# Patient Record
Sex: MALE | Race: WHITE
[De-identification: names, ages, dates, MRNs, and addresses within clinical notes are randomized per-mention and may not be internally consistent; named-entity substitution may affect disease eponyms.]

---

## 2017-02-08 ENCOUNTER — HOSPITAL ENCOUNTER (OUTPATIENT)
Dept: HOSPITAL 33 - ED | Age: 27
Setting detail: OBSERVATION
LOS: 1 days | Discharge: HOME | End: 2017-02-09
Attending: FAMILY MEDICINE | Admitting: FAMILY MEDICINE
Payer: COMMERCIAL

## 2017-02-08 DIAGNOSIS — R55: Primary | ICD-10-CM

## 2017-02-08 DIAGNOSIS — C95.11: ICD-10-CM

## 2017-02-08 DIAGNOSIS — J06.9: ICD-10-CM

## 2017-02-08 DIAGNOSIS — B97.89: ICD-10-CM

## 2017-02-08 DIAGNOSIS — E66.01: ICD-10-CM

## 2017-02-08 DIAGNOSIS — C95.10: ICD-10-CM

## 2017-02-08 LAB
ALBUMIN SERPL-MCNC: 4 G/DL (ref 3.4–5)
ALP SERPL-CCNC: 79 U/L (ref 46–116)
ALT SERPL-CCNC: 23 U/L (ref 12–78)
ANION GAP SERPL CALC-SCNC: 16.5 MEQ/L (ref 5–15)
AST SERPL QL: 20 U/L (ref 15–37)
BASOPHILS NFR BLD AUTO: 0.3 % (ref 0–0.4)
BILIRUB BLD-MCNC: 0.9 MG/DL (ref 0.2–1)
BUN SERPL-MCNC: 10 MG/DL (ref 9–20)
CHLORIDE SERPL-SCNC: 103 MEQ/L (ref 98–107)
CO2 SERPL-SCNC: 24.7 MEQ/L (ref 21–32)
COLLECTION TYPE: (no result)
COMPLETE URINE MICROSCOPIC?: YES
GLUCOSE SERPL-MCNC: 99 MG/DL (ref 70–110)
MCH RBC QN AUTO: 27.5 PG (ref 26–32)
NEUTROPHILS NFR BLD AUTO: 77.4 % (ref 36–66)
PLATELET # BLD AUTO: 195 K/MM3 (ref 150–450)
POTASSIUM SERPLBLD-SCNC: 4.2 MEQ/L (ref 3.5–5.1)
PROT SERPL-MCNC: 7.7 GM/DL (ref 6.4–8.2)
RBC # BLD AUTO: 5.34 M/MM3 (ref 4.1–5.6)
SODIUM SERPL-SCNC: 140 MEQ/L (ref 136–145)
WBC # BLD AUTO: 10.5 K/MM3 (ref 4–10.5)
WBC URNS QL MICRO: (no result) /HPF (ref 0–5)

## 2017-02-08 PROCEDURE — 93041 RHYTHM ECG TRACING: CPT

## 2017-02-08 PROCEDURE — 87430 STREP A AG IA: CPT

## 2017-02-08 PROCEDURE — 83605 ASSAY OF LACTIC ACID: CPT

## 2017-02-08 PROCEDURE — 96361 HYDRATE IV INFUSION ADD-ON: CPT

## 2017-02-08 PROCEDURE — 87040 BLOOD CULTURE FOR BACTERIA: CPT

## 2017-02-08 PROCEDURE — 36415 COLL VENOUS BLD VENIPUNCTURE: CPT

## 2017-02-08 PROCEDURE — 87070 CULTURE OTHR SPECIMN AEROBIC: CPT

## 2017-02-08 PROCEDURE — 81000 URINALYSIS NONAUTO W/SCOPE: CPT

## 2017-02-08 PROCEDURE — 80053 COMPREHEN METABOLIC PANEL: CPT

## 2017-02-08 PROCEDURE — 36000 PLACE NEEDLE IN VEIN: CPT

## 2017-02-08 PROCEDURE — 93005 ELECTROCARDIOGRAM TRACING: CPT

## 2017-02-08 PROCEDURE — 71010: CPT

## 2017-02-08 PROCEDURE — 99284 EMERGENCY DEPT VISIT MOD MDM: CPT

## 2017-02-08 PROCEDURE — 82962 GLUCOSE BLOOD TEST: CPT

## 2017-02-08 PROCEDURE — 85025 COMPLETE CBC W/AUTO DIFF WBC: CPT

## 2017-02-08 PROCEDURE — 93268 ECG RECORD/REVIEW: CPT

## 2017-02-08 PROCEDURE — 80048 BASIC METABOLIC PNL TOTAL CA: CPT

## 2017-02-08 PROCEDURE — 96360 HYDRATION IV INFUSION INIT: CPT

## 2017-02-08 PROCEDURE — 99285 EMERGENCY DEPT VISIT HI MDM: CPT

## 2017-02-08 PROCEDURE — 87631 RESP VIRUS 3-5 TARGETS: CPT

## 2017-02-08 NOTE — ERPHSYRPT
- History of Present Illness


Source: patient


Exam Limitations: clinical condition


Patient Subjective Stated Complaint: "i statrted feeling bad earlier today. i 

went to try to take a shower and became light headed, i vission got funny and i 

almost passed out. i am taking a chemo drug still for my chronic lukemia"


Triage Nursing Assessment: aox3, breating unlabored,s kin flushed, diaphoretic, 

warm, steady gait, speaking in complete setences


Physician History: 


Patient with nasal congestion sinus pressure and cough sore throat over past 

couple of days and today when he walked into the shower he felt like he would 

pass out and did not actual fall or lose consciousness.  Came here for further 

evaluation treatment.  Patient has history of chronic leukemia on ongoing 

medication is on disability for such.  He thinks he's had a low-grade fever and 

occasional chills.  No wheezing shortness of breath   Or chest pain.





Prior Episodes: single episode today


Timing/Duration: today


Precipitating Factors: lightheadedness, other (as above)


Context: standing


Loss of Consciousness: no loss of consciousness, other (as noted)


Charcter of event(s): almost passed out


Allergies/Adverse Reactions: 








No Known Drug Allergies Allergy (Verified 02/08/17 17:24)


 





Home Medications: 








Dasatinib [Sprycel] 100 mg PO DAILY 02/08/17 [History]





Hx Tetanus, Diphtheria Vaccination/Date Given: Yes


Hx Influenza Vaccination/Date Given: No


Hx Pneumococcal Vaccination/Date Given: No





- Past Medical History


Pertinent Past Medical History: No


Neurological History: No Pertinent History


Cardiac History: No Pertinent History


Respiratory History: Asthma


Endocrine Medical History: No Pertinent History


Musculoskeletal History: Arthritis


Other Medical History: Chronic Myeloid Leukemia





- Past Surgical History


Past Surgical History: Yes


Other Surgical History: CYST REMOVAL





- Social History


Smoking Status: Never smoker


Exposure to second hand smoke: No


Drug Use: marijuana


Patient Lives Alone: No





- Review of Systems


Constitutional: Fever, Chills, Fatigue, Lethargy, Malaise, Weakness


Eyes: No Symptoms


Ears, Nose, & Throat: No Symptoms


Respiratory: Cough, No Dyspnea, No Dyspnea on Exertion (FIELDS), No Wheezing


Cardiac: Other, No Chest Pain, No Edema, No Palpitations, No Syncope (

presyncope as noted)


Abdominal/Gastrointestinal: No Symptoms, Abdominal Pain, Nausea


Genitourinary Symptoms: No Symptoms, Dysuria


Musculoskeletal: No Symptoms, No Arthralgias, No Back Pain, No Neck Pain, No 

Deformity, No Fall


Skin: No Symptoms


Neurological: No Symptoms


Psychological: No Symptoms


Endocrine: No Symptoms


Hematologic/Lymphatic: No Symptoms


Immunological/Allergic: No Symptoms


All Other Systems: Reviewed and Negative





Physical Exam





- Nursing Vital Signs


Nursing Vital Signs: 


 Initial Vital Signs











Temperature                    99.7 F


 


Temperature Source             Oral


 


Pulse Rate                     92


 


Respiratory Rate               16


 


Blood Pressure [Right Arm]     126/60


 


Pain Intensity                 3

















- Rafael Coma Scale


Best Eye Response ([place]): (2) open to pain


Best Verbal Response ([place]): (5) oriented


Best Motor Response ([place]): (6) obeys commands


Rafael Total: 13





- Physical Exam


General Appearance: mild distress, obese (super morbid)


Eye Exam: bilateral eye: normal inspection, PERRL, EOMI


Ears, Nose, Throat Exam: pharyngeal erythema, other (Nasal mucosal edema with 

palpable  Tenderness)


Neck Exam: normal inspection, non-tender, supple, full range of motion, No 

meningismus, No Brudzinski


Respiratory: normal breath sounds, lungs clear, airway intact, No chest 

tenderness


Cardiovascular: regular rate/rhythm, normal heart sounds, normal peripheral 

pulses, capillary refill <2 sec


Gastrointestinal: soft, normal bowel sounds, other (Massively obese difficult 

exam), No tenderness, No distention, No mass, No guarding, No rebound, No hernia


Rectal Exam: deferred


Back Exam: normal inspection, normal range of motion, No CVA tenderness


Extremity Exam: normal inspection, normal range of motion


Peripheral Pulses: carotid (R): 2+, carotid (L): 2+


Mental Status: alert, oriented x 3, cooperative


CNs Exam: normal hearing, normal speech, PERRL


Coordination/Gait: normal finger to nose


Motor/Sensory: no motor deficit, no sensory deficit, no pronator drift, 

negative Babinski's sign, No weak motor strength RUE, No weak motor strength LUE

, No weak motor strength RLE, No weak motor strength LLE


Skin Exam: normal color, warm, dry


**SpO2 Interpretation**: normal


SpO2: 97


Oxygen Delivery: Room Air





- Course


Nursing assessment & vital signs reviewed: Yes


EKG Interpreted by Me: RATE ([id number]), Sinus Tach, Left Axis Deviation, NORMAL 

INTERVALS, NORMAL QRS, NORMAL ST-T, Other (no acute changes)





- Radiology Exams


  ** Chest


X-ray Interpretation: Interpreted by me, No Infiltrates, Other (borderline 

cardiomegaly)


Ordered Tests: 


 Active Orders 24 hr











 Category Date Time Status


 


 Up With Assistance ROUTINE Activity  02/08/17 19:50 Active


 


 Accucheck STAT Care  02/08/17 17:26 Active


 


 Admission/Status Order ROUTINE Care  02/08/17 19:50 Active


 


 Cardiac Monitor STAT Care  02/08/17 17:26 Active


 


 Code Status Order ROUTINE Care  02/08/17 19:50 Active


 


 EKG-ER Only STAT Care  02/08/17 17:26 Active


 


 Fall Protocol ROUTINE Care  02/08/17 19:52 Active


 


 IV Care Q6H Care  02/08/17 19:50 Active


 


 IV Insertion STAT Care  02/08/17 17:26 Active


 


 Miscellaneous Nursing Order ROUTINE Care  02/08/17 19:50 Active


 


 Neuro Checks Q4H Care  02/08/17 19:50 Active


 


 Pulse Oximetry (ED) STAT Care  02/08/17 17:26 Active


 


 Rectal Temperature STAT Care  02/08/17 17:26 Active


 


 Telemetry ROUTINE Care  02/08/17 19:50 Active


 


 Vital Signs Q4H Care  02/08/17 19:50 Active


 


 Regular Diet Diet  02/08/17 Breakfast Active


 


 CHEST 1 VIEW (PORTABLE) Stat Exams  02/08/17 17:27 Taken


 


 BLOOD CULTURE Stat Lab  02/08/17 17:57 Received


 


 CBC W DIFF AM.LAB Lab  02/09/17 04:00 Ordered


 


 CBC W DIFF Stat Lab  02/08/17 17:40 Completed


 


 CMP AM.LAB Lab  02/09/17 04:00 Ordered


 


 CMP Stat Lab  02/08/17 17:40 Completed


 


 CULTURE, THROAT Stat Lab  02/08/17 17:55 Received


 


 Lactic Acid Urgent Lab  02/08/17 17:50 Completed


 


 STREP SCREEN-BETA A Stat Lab  02/08/17 17:55 Completed


 


 UA Stat Lab  02/08/17 18:15 Received


 


 Pulse Oximetry CONTINUOUS RT  02/08/17 19:52 Active








Medication Summary











Generic Name Dose Route Start Last Admin





  Trade Name Freq  PRN Reason Stop Dose Admin


 


Acetaminophen  650 mg  02/08/17 19:50  





  Tylenol 325 Mg***  PO  03/10/17 19:49  





  Q4H PRN PRN   





  PAIN AND/OR FEVER   


 


Sodium Chloride  1,000 mls @ 150 mls/hr  02/08/17 20:00  





  Sodium Chloride 0.9% 1000 Ml  IV  03/10/17 19:59  





  .Q6H40M [facility]   


 


Ondansetron HCl  4 mg  02/08/17 19:50  





  Zofran 4 Mg/2 Ml Vial**  IV  03/10/17 19:49  





  Q6H PRN PRN   





  NAUSEA/VOMITING   














Discontinued Medications














Generic Name Dose Route Start Last Admin





  Trade Name Freq  PRN Reason Stop Dose Admin


 


Acetaminophen  1,000 mg  02/08/17 18:06  02/08/17 18:12





  Tylenol Extra Strength 500 Mg***  PO  02/08/17 18:07  1,000 mg





  STAT STA   Administration


 


Acetaminophen  Confirm  02/08/17 18:11  





  Tylenol Extra Strength 500 Mg***  Administered  02/08/17 18:12  





  Dose   





  1,000 mg   





  .ROUTE   





  .STK-MED ONE   


 


Sodium Chloride  1,000 mls @ 999 mls/hr  02/08/17 17:26  02/08/17 17:56





  Sodium Chloride 0.9% 1000 Ml  IV  02/08/17 18:26  999 mls/hr





  .Q1H1M STA   Administration


 


Sodium Chloride  Confirm  02/08/17 17:43  





  Sodium Chloride 0.9% 1000 Ml  Administered  02/08/17 17:44  





  Dose   





  1,000 mls @ ud   





  .ROUTE   





  .STK-MED ONE   


 


Sodium Chloride  1,000 mls @ 999 mls/hr  02/08/17 19:14  02/08/17 19:37





  Sodium Chloride 0.9% 1000 Ml  IV  02/08/17 20:14  999 mls/hr





  .Q1H1M STA   Administration


 


Sodium Chloride  Confirm  02/08/17 19:17  





  Sodium Chloride 0.9% 1000 Ml  Administered  02/08/17 19:18  





  Dose   





  1,000 mls @ ud   





  .ROUTE   





  .STK-MED ONE   


 


Sodium Chloride  Confirm  02/08/17 19:33  





  Sodium Chloride 0.9% 1000 Ml  Administered  02/08/17 19:34  





  Dose   





  1,000 mls @ ud   





  .ROUTE   





  .STK-MED ONE   











Lab/Rad Data: 


 Laboratory Result Diagrams





 02/08/17 17:40 





 02/08/17 17:40 





 Laboratory Results











  02/08/17 02/08/17 02/08/17 Range/Units





  17:55 17:55 17:50 


 


WBC     (4.0-10.5)  K/mm3


 


RBC     (4.1-5.6)  M/mm3


 


Hgb     (12.5-18.0)  gm/dl


 


Hct     (42-50)  %


 


MCV     ()  fl


 


MCH     (26-32)  pg


 


MCHC     (32-36)  g/dl


 


RDW     (11.5-14.0)  %


 


Plt Count     (150-450)  K/mm3


 


MPV     (6-9.5)  fl


 


Gran %     (36.0-66.0)  %


 


Lymphocytes %     (24.0-44.0)  %


 


Monocytes %     (0.0-12.0)  %


 


Eosinophils %     (0.00-5.0)  %


 


Basophils %     (0.0-0.4)  %


 


Basophils #     (0-0.4)  


 


Sodium     (136-145)  mEq/L


 


Potassium     (3.5-5.1)  mEq/L


 


Chloride     ()  mEq/L


 


Carbon Dioxide     (21-32)  mEq/L


 


Anion Gap     (5-15)  MEQ/L


 


BUN     (9-20)  mg/dL


 


Creatinine     (0.55-1.30)  mg/dl


 


Estimated GFR     ML/MIN


 


Glucose     ()  MG/DL


 


Lactic Acid    1.3  (0.4-2.0)  


 


Calcium     (8.5-10.1)  mg/dL


 


Total Bilirubin     (0.2-1.0)  mg/dL


 


AST     (15-37)  U/L


 


ALT     (12-78)  U/L


 


Alkaline Phosphatase     ()  U/L


 


Serum Total Protein     (6.4-8.2)  gm/dL


 


Albumin     (3.4-5.0)  g/dL


 


Streptococcus Screen   NEGATIVE   (Negative)  


 


Resp Infection Panel  NEGATIVE    (Negative)  














  02/08/17 02/08/17 Range/Units





  17:40 17:40 


 


WBC   10.5  (4.0-10.5)  K/mm3


 


RBC   5.34  (4.1-5.6)  M/mm3


 


Hgb   14.7  (12.5-18.0)  gm/dl


 


Hct   42.6  (42-50)  %


 


MCV   79.8  ()  fl


 


MCH   27.5  (26-32)  pg


 


MCHC   34.5  (32-36)  g/dl


 


RDW   14.7 H  (11.5-14.0)  %


 


Plt Count   195  (150-450)  K/mm3


 


MPV   9.7 H  (6-9.5)  fl


 


Gran %   77.4 H  (36.0-66.0)  %


 


Lymphocytes %   10.6 L  (24.0-44.0)  %


 


Monocytes %   10.9  (0.0-12.0)  %


 


Eosinophils %   0.8  (0.00-5.0)  %


 


Basophils %   0.3  (0.0-0.4)  %


 


Basophils #   0.03  (0-0.4)  


 


Sodium  140   (136-145)  mEq/L


 


Potassium  4.2   (3.5-5.1)  mEq/L


 


Chloride  103   ()  mEq/L


 


Carbon Dioxide  24.7   (21-32)  mEq/L


 


Anion Gap  16.5 H   (5-15)  MEQ/L


 


BUN  10   (9-20)  mg/dL


 


Creatinine  0.87   (0.55-1.30)  mg/dl


 


Estimated GFR  > 60   ML/MIN


 


Glucose  99   ()  MG/DL


 


Lactic Acid    (0.4-2.0)  


 


Calcium  9.0   (8.5-10.1)  mg/dL


 


Total Bilirubin  0.9   (0.2-1.0)  mg/dL


 


AST  20   (15-37)  U/L


 


ALT  23   (12-78)  U/L


 


Alkaline Phosphatase  79   ()  U/L


 


Serum Total Protein  7.7   (6.4-8.2)  gm/dL


 


Albumin  4.0   (3.4-5.0)  g/dL


 


Streptococcus Screen    (Negative)  


 


Resp Infection Panel    (Negative)  














- Progress


Progress: unchanged


Progress Note: 





02/08/17 19:46Noted rectal temp 101.5.  Treated with Tylenol with good results.

  Testing for strep and respiratory diseases are negative.  Chest x-ray was 

negative.  CBC did show a left shift but no other significant changes.  Patient 

still felt generalized malaise and mild headache and weak in general so case 

was discussed with Dr. Bojorquez and patient will be admitted for observation given 

the fact he has chronic leukemia.  This is in agreement with the patient and 

mom.








Discussed with : [place]


Will see patient in: hospital (observation)


Counseled pt/family regarding: lab results, diagnosis, need for follow-up, rad 

results





- Departure


Time of Disposition: 19:47


Departure Disposition: Observation


Clinical Impression: 


 Pre-syncope, Acute viral syndrome, Morbid obesity with BMI of 50.0-59.9, adult





URI (upper respiratory infection)


Qualifiers:


 URI type: unspecified viral URI Qualified Code(s): J06.9 - Acute upper 

respiratory infection, unspecified





Chronic leukemia


Qualifiers:


 Leukemia Active/Remission status: in remission Qualified Code(s): C95.11 - 

Chronic leukemia of unspecified cell type, in remission





Fever


Qualifiers:


 Fever type: unspecified Qualified Code(s): R50.9 - Fever, unspecified





Condition: Stable


Critical Care Time: No

## 2017-02-09 VITALS — SYSTOLIC BLOOD PRESSURE: 123 MMHG | HEART RATE: 83 BPM | DIASTOLIC BLOOD PRESSURE: 58 MMHG

## 2017-02-09 VITALS — OXYGEN SATURATION: 97 %

## 2017-02-09 LAB
BASOPHILS NFR BLD AUTO: 0.5 % (ref 0–0.4)
MCH RBC QN AUTO: 26.8 PG (ref 26–32)
NEUTROPHILS NFR BLD AUTO: 53.1 % (ref 36–66)
PLATELET # BLD AUTO: 223 K/MM3 (ref 150–450)
RBC # BLD AUTO: 4.66 M/MM3 (ref 4.1–5.6)
WBC # BLD AUTO: 5.8 K/MM3 (ref 4–10.5)

## 2017-02-09 NOTE — PCM.SSS
History of Present Illness





- Chief Complaint


Chief Complaint: Presyncope


History of Present Illness: 


 is a 26 year old male who presented to the ER complaining of cough, 

congestion, fever and lightheadedness. He felt weak at home and thought he 

might pass out. In the ER his workup was essentially negative, he was hydrated 

overnight and observed for presyncope. He has been out of bed to the restroom 

in his room this morning with no dizziness or lightheadedness, he has not had 

fever overnight.








- Review of Systems


Constitutional: Fever, Chills, Weakness


Respiratory: Cough


Cardiac: No Chest Pain, No Edema, No Syncope


Abdominal/Gastrointestinal: No Abdominal Pain, No Nausea, No Vomiting, No 

Diarrhea


Genitourinary Symptoms: No Dysuria


Skin: No Rash


All Other Systems: Reviewed and Negative





Medications & Allergies


Home Medications: 


 Home Medication List





Dasatinib [Sprycel] 100 mg PO DAILY 02/08/17 [History Confirmed 02/08/17]








Allergies/Adverse Reactions: 


 Allergies











Allergy/AdvReac Type Severity Reaction Status Date / Time


 


No Known Drug Allergies Allergy   Verified 02/08/17 17:24














- Past Medical History


Past Medical History: No


Neurological History: No Pertinent History


ENT History: No Pertinent History


Cardiac History: No Pertinent History


Respiratory History: Asthma


Endocrine Medical History: No Pertinent History


Musculoskelatal History: Arthritis


GI Medical History: No Pertinent History


 History: No Pertinent History


Pyscho-Social History: No Pertinent History


Male Reproductive Disorders: No Pertinent History


Comment: Chronic Myeloid Leukemia





- Past Surgical History


Past Surgical History: Yes


Neuro Surgical History: No Pertinent History


Cardiac History: No Pertinent History


Respiratory Surgery: No Pertinent History


GI Surgical History: No Pertinent History


Genitourinary Surgical Hx: No Pertinent History


Musculskeletal Surgical Hx: No Pertinent History


Male Surgical History: No Pertinent History


Other Surgical History: CYST REMOVAL.  Penial shunt





- Social History


Smoking Status: Never smoker


Exposure to second hand smoke: No


Alcohol: Weekly


Drug Use: none





- Physical Exam


Vital Signs: 


 Vital Signs - 24 hr











  Temp Pulse Resp BP BP Pulse Ox


 


 02/09/17 08:00  97.7 F  83  20  123/58   97


 


 02/09/17 03:46    20   


 


 02/09/17 03:39  97.7 F  79  20  123/57   97


 


 02/09/17 00:00    20   


 


 02/08/17 23:31  97.8 F  87  20  125/57   96


 


 02/08/17 23:14  98.1 F  103 H  21  122/55   98


 


 02/08/17 20:47       97


 


 02/08/17 19:14  99.7 F  92 H  16   126/60  98


 


 02/08/17 18:13   101 H  18   128/60  97


 


 02/08/17 18:12  101.5 F     


 


 02/08/17 18:04  101.5 F     


 


 02/08/17 17:31       95


 


 02/08/17 17:18  100.1 F  113 H  20   151/64  97











General Appearance: no apparent distress, alert, obese (morbid)


Respiratory Exam: normal breath sounds, lungs clear, No respiratory distress


Cardiovascular Exam: regular rate/rhythm, normal heart sounds, normal 

peripheral pulses


Gastrointestinal/Abdomen Exam: soft, normal bowel sounds, No tenderness, No mass


Extremity Exam: normal inspection, normal range of motion, pelvis stable


Skin Exam: normal color, warm, dry, No rash





Results





- Labs


Lab/Micro Results: 


 Lab Results-Last 24 Hours











  02/09/17 02/09/17 Range/Units





  04:48 04:48 


 


WBC  5.8   (4.0-10.5)  K/mm3


 


RBC  4.66   (4.1-5.6)  M/mm3


 


Hgb  12.5   (12.5-18.0)  gm/dl


 


Hct  37.9 L   (42-50)  %


 


MCV  81.3   ()  fl


 


MCH  26.8   (26-32)  pg


 


MCHC  33.0   (32-36)  g/dl


 


RDW  14.8 H   (11.5-14.0)  %


 


Plt Count  223   (150-450)  K/mm3


 


MPV  9.7 H   (6-9.5)  fl


 


Gran %  53.1   (36.0-66.0)  %


 


Lymphocytes %  30.6   (24.0-44.0)  %


 


Monocytes %  13.0 H   (0.0-12.0)  %


 


Eosinophils %  2.8   (0.00-5.0)  %


 


Basophils %  0.5   (0.0-0.4)  %


 


Basophils #  0.03   (0-0.4)  


 


Glucose     ()  MG/DL














Assessment/Plan


(1) Pre-syncope


Current Visit: Yes   Status: Acute   


Assessment & Plan: 


symptoms resolved, likely related to fever and viral illness. nothing worrisome 

on monitor overnight








(2) URI (upper respiratory infection)


Current Visit: Yes   Status: Acute   


Qualifiers: 


   URI type: unspecified viral URI   Qualified Code(s): J06.9 - Acute upper 

respiratory infection, unspecified; B97.89 - Other viral agents as the cause of 

diseases classified elsewhere   


Code(s): J06.9 - ACUTE UPPER RESPIRATORY INFECTION, UNSPECIFIED   





(3) Chronic leukemia


Current Visit: Yes   Status: Acute   


Qualifiers: 


   Leukemia Active/Remission status: in remission   Qualified Code(s): C95.11 - 

Chronic leukemia of unspecified cell type, in remission   


Code(s): C95.10 - CHRONIC LEUKEMIA OF UNSP CELL TYPE NOT ACHIEVE REMISSION   





(4) Morbid obesity with BMI of 50.0-59.9, adult


Current Visit: Yes   Status: Acute   Code(s): E66.01 - MORBID (SEVERE) OBESITY 

DUE TO EXCESS CALORIES; Z68.43 - BODY MASS INDEX (BMI) 50-59.9 , ADULT   





Hospital Summary





- Vitals & Intake/Output


Vital Signs: 


 Vital Signs











Temperature  97.7 F   02/09/17 08:00


 


Pulse Rate  83   02/09/17 08:00


 


Respiratory Rate  20   02/09/17 08:00


 


Blood Pressure  123/58   02/09/17 08:00


 


O2 Sat by Pulse Oximetry  97   02/09/17 08:00











Intake & Output: 


 Intake & Output











 02/06/17 02/07/17 02/08/17 02/09/17





 11:59 11:59 11:59 11:59


 


Intake Total    1852


 


Balance    1852


 


Weight    175.71 kg














- Lab


Result Diagrams: 


 02/09/17 04:48





 02/08/17 17:40


Lab Results-Last 24 Hrs: 


 Lab Results-Last 24 Hours











  02/09/17 02/09/17 Range/Units





  04:48 04:48 


 


WBC  5.8   (4.0-10.5)  K/mm3


 


RBC  4.66   (4.1-5.6)  M/mm3


 


Hgb  12.5   (12.5-18.0)  gm/dl


 


Hct  37.9 L   (42-50)  %


 


MCV  81.3   ()  fl


 


MCH  26.8   (26-32)  pg


 


MCHC  33.0   (32-36)  g/dl


 


RDW  14.8 H   (11.5-14.0)  %


 


Plt Count  223   (150-450)  K/mm3


 


MPV  9.7 H   (6-9.5)  fl


 


Gran %  53.1   (36.0-66.0)  %


 


Lymphocytes %  30.6   (24.0-44.0)  %


 


Monocytes %  13.0 H   (0.0-12.0)  %


 


Eosinophils %  2.8   (0.00-5.0)  %


 


Basophils %  0.5   (0.0-0.4)  %


 


Basophils #  0.03   (0-0.4)  


 


Glucose     ()  MG/DL














- Discharge


Disposition: Home, Self-Care


Condition: Stable


Prescriptions: 


Continue


   Dasatinib [Sprycel] 100 mg PO DAILY


Additional Instructions: 


push clear liquids, rest and take tylenol for fever. followup in the office in 

1 week for recheck. return for new symmptoms or change of condition.


Follow up with: 


LORI CERON MD [Primary Care Provider] -

## 2017-02-09 NOTE — XRAY
Indication: Fever.



Comparison: None



Portable apical lordotic chest clear.  Heart and mediastinal structures within

normal limits for AP portable projection.  Bony thorax intact.



Impression: Nonacute chest.

## 2022-07-06 ENCOUNTER — HOSPITAL ENCOUNTER (EMERGENCY)
Dept: HOSPITAL 33 - ED | Age: 32
Discharge: HOME | End: 2022-07-06
Payer: COMMERCIAL

## 2022-07-06 VITALS — HEART RATE: 78 BPM | DIASTOLIC BLOOD PRESSURE: 82 MMHG | SYSTOLIC BLOOD PRESSURE: 141 MMHG

## 2022-07-06 VITALS — OXYGEN SATURATION: 98 %

## 2022-07-06 DIAGNOSIS — R61: ICD-10-CM

## 2022-07-06 DIAGNOSIS — Z79.899: ICD-10-CM

## 2022-07-06 DIAGNOSIS — I51.7: Primary | ICD-10-CM

## 2022-07-06 DIAGNOSIS — R07.9: ICD-10-CM

## 2022-07-06 DIAGNOSIS — R06.02: ICD-10-CM

## 2022-07-06 DIAGNOSIS — C92.10: ICD-10-CM

## 2022-07-06 LAB
ALBUMIN SERPL-MCNC: 3.9 G/DL (ref 3.5–5)
ALP SERPL-CCNC: 82 U/L (ref 38–126)
ALT SERPL-CCNC: 28 U/L (ref 0–50)
AMPHETAMINES UR QL: NEGATIVE
ANION GAP SERPL CALC-SCNC: 13.5 MEQ/L (ref 5–15)
AST SERPL QL: 39 U/L (ref 17–59)
BARBITURATES UR QL: NEGATIVE
BASOPHILS # BLD AUTO: 0.04 X10^3/UL (ref 0–0.4)
BENZODIAZ UR QL SCN: NEGATIVE
BILIRUB BLD-MCNC: 1.2 MG/DL (ref 0.2–1.3)
BUN SERPL-MCNC: 10 MG/DL (ref 9–20)
CALCIUM SPEC-MCNC: 9.5 MG/DL (ref 8.4–10.2)
CHLORIDE SERPL-SCNC: 103 MMOL/L (ref 98–107)
CO2 SERPL-SCNC: 26 MMOL/L (ref 22–30)
COCAINE UR QL SCN: NEGATIVE
CREAT SERPL-MCNC: 0.65 MG/DL (ref 0.66–1.25)
EOSINOPHIL # BLD AUTO: 0.14 X10^3/UL (ref 0–0.5)
ETHANOL SERPL-MCNC: < 10 MG/DL (ref 0–10)
GFR SERPLBLD BASED ON 1.73 SQ M-ARVRAT: > 60 ML/MIN
GLUCOSE SERPL-MCNC: 105 MG/DL (ref 74–106)
GLUCOSE UR-MCNC: NEGATIVE MG/DL
HCT VFR BLD AUTO: 38.3 % (ref 42–50)
HGB BLD-MCNC: 12.6 G/DL (ref 12.5–18)
LYMPHOCYTES # SPEC AUTO: 1.52 X10^3/UL (ref 1–4.6)
MCH RBC QN AUTO: 26.1 PG (ref 26–32)
MCHC RBC AUTO-ENTMCNC: 32.9 G/DL (ref 32–36)
METHADONE UR QL: NEGATIVE
MONOCYTES # BLD AUTO: 0.68 X10^3/UL (ref 0–1.3)
OPIATES UR QL: NEGATIVE
PCP UR QL CFM>20 NG/ML: NEGATIVE
PLATELET # BLD AUTO: 287 X10^3/UL (ref 150–450)
POTASSIUM SERPLBLD-SCNC: 3.9 MMOL/L (ref 3.5–5.1)
PROT SERPL-MCNC: 7.1 G/DL (ref 6.3–8.2)
PROT UR STRIP-MCNC: NEGATIVE MG/DL
RBC # BLD AUTO: 4.83 X10^6/UL (ref 4.1–5.6)
RBC # UR AUTO: NEGATIVE ERY/UL (ref 0–5)
RBC #/AREA URNS HPF: (no result) /HPF (ref 0–2)
SODIUM SERPL-SCNC: 138 MMOL/L (ref 137–145)
THC UR QL SCN: NEGATIVE
UA DIPSTICK PNL UR: (no result)
URINE CULTURED INDICATED?: NO
WBC # BLD AUTO: 6.9 X10^3/UL (ref 4–10.5)
WBC #/AREA URNS HPF: (no result) /HPF (ref 0–5)

## 2022-07-06 PROCEDURE — 80307 DRUG TEST PRSMV CHEM ANLYZR: CPT

## 2022-07-06 PROCEDURE — 87040 BLOOD CULTURE FOR BACTERIA: CPT

## 2022-07-06 PROCEDURE — 81015 MICROSCOPIC EXAM OF URINE: CPT

## 2022-07-06 PROCEDURE — 94760 N-INVAS EAR/PLS OXIMETRY 1: CPT

## 2022-07-06 PROCEDURE — G0480 DRUG TEST DEF 1-7 CLASSES: HCPCS

## 2022-07-06 PROCEDURE — 83605 ASSAY OF LACTIC ACID: CPT

## 2022-07-06 PROCEDURE — 71045 X-RAY EXAM CHEST 1 VIEW: CPT

## 2022-07-06 PROCEDURE — 36000 PLACE NEEDLE IN VEIN: CPT

## 2022-07-06 PROCEDURE — 93041 RHYTHM ECG TRACING: CPT

## 2022-07-06 PROCEDURE — 99284 EMERGENCY DEPT VISIT MOD MDM: CPT

## 2022-07-06 PROCEDURE — 36415 COLL VENOUS BLD VENIPUNCTURE: CPT

## 2022-07-06 PROCEDURE — 85025 COMPLETE CBC W/AUTO DIFF WBC: CPT

## 2022-07-06 PROCEDURE — 93005 ELECTROCARDIOGRAM TRACING: CPT

## 2022-07-06 PROCEDURE — 80053 COMPREHEN METABOLIC PANEL: CPT

## 2022-07-06 PROCEDURE — 84484 ASSAY OF TROPONIN QUANT: CPT

## 2022-07-06 PROCEDURE — 85379 FIBRIN DEGRADATION QUANT: CPT

## 2022-07-06 NOTE — ERPHSYRPT
- History of Present Illness


Time Seen by Provider: 07/06/22 13:20


Source: patient


Exam Limitations: no limitations


Physician History: 





Patient is a 32-year-old male with a history of CML leukemia currently on 

chemotherapy presents to our ED with complaints of acute onset chest pain and 

diaphoresis.  Patient states he was walking his dog in the hot weather.  He then

acutely experienced chest pain.  Mild shortness of breath.  No syncope.  Patient

walked into his house.  EMS was called.  Patient was treated for possible heat 

exhaustion/heat stroke.  Patient was diaphoretic.  No nausea or vomiting.  No 

diarrhea.  No rash.  No fever.  Glucose is 91.  Chest pain is substernal.  

Patient denies history of the same.  Symptoms are constant.  Symptoms are 

moderate in intensity.  No specific worsening improving factors.  Patient is 

otherwise healthy.  He voices no other complaints or concerns at this time.


Timing/Duration: today


Severity: moderate


Modifying Factors: Improves With: nothing


Associated Symptoms: shortness of breath


Allergies/Adverse Reactions: 








No Known Drug Allergies Allergy (Verified 07/06/22 13:11)


   





Home Medications: 








Dasatinib [Sprycel] 100 mg PO DAILY 02/08/17 [History]





Hx Tetanus, Diphtheria Vaccination/Date Given: Yes


Hx Influenza Vaccination/Date Given: No


Hx Pneumococcal Vaccination/Date Given: No





- Review of Systems


Constitutional: No Symptoms, No Fever, No Chills


Eyes: No Symptoms


Ears, Nose, & Throat: No Symptoms


Respiratory: No Symptoms, No Cough, No Dyspnea


Cardiac: No Symptoms, No Chest Pain, No Edema, No Syncope


Abdominal/Gastrointestinal: No Symptoms, No Abdominal Pain, No Nausea, No 

Vomiting, No Diarrhea


Genitourinary Symptoms: No Symptoms, No Dysuria


Musculoskeletal: No Symptoms, No Back Pain, No Neck Pain


Skin: No Symptoms, No Rash


Neurological: No Symptoms, No Dizziness, No Focal Weakness, No Sensory Changes


Psychological: No Symptoms


Endocrine: No Symptoms


Hematologic/Lymphatic: No Symptoms


Immunological/Allergic: No Symptoms


All Other Systems: Reviewed and Negative





- Past Medical History


Pertinent Past Medical History: No


Neurological History: No Pertinent History


ENT History: No Pertinent History


Cardiac History: No Pertinent History


Respiratory History: Asthma


Endocrine Medical History: No Pertinent History


Musculoskeletal History: Arthritis


GI Medical History: No Pertinent History


 History: No Pertinent History


Psycho-Social History: No Pertinent History


Male Reproductive Disorders: No Pertinent History


Other Medical History: Chronic Myeloid Leukemia





- Past Surgical History


Past Surgical History: Yes


Neuro Surgical History: No Pertinent History


Cardiac: No Pertinent History


Respiratory: No Pertinent History


Gastrointestinal: No Pertinent History


Genitourinary: No Pertinent History


Musculoskeletal: No Pertinent History


Male Surgical History: No Pertinent History


Other Surgical History: CYST REMOVAL.  Penial shunt





- Social History


Smoking Status: Never smoker


Exposure to second hand smoke: No


Drug Use: none


Patient Lives Alone: No





- Nursing Vital Signs


Nursing Vital Signs: 


                               Initial Vital Signs











Temperature  97.8 F   07/06/22 13:12


 


Pulse Rate  97 H  07/06/22 13:12


 


Respiratory Rate  18   07/06/22 13:12


 


Blood Pressure  164/85   07/06/22 13:12


 


O2 Sat by Pulse Oximetry  98   07/06/22 13:12








                                   Pain Scale











Pain Intensity                 3

















- Physical Exam


General Appearance: alert, other (Mildly diaphoretic.  No acute distress 

otherwise)


Eye Exam: PERRL/EOMI, eyes nml inspection


Ears, Nose, Throat Exam: normal ENT inspection, TMs normal, pharynx normal, 

moist mucous membranes


Neck Exam: normal inspection, non-tender, supple, full range of motion


Respiratory Exam: normal breath sounds, lungs clear, airway intact, No 

respiratory distress


Cardiovascular Exam: regular rate/rhythm, normal heart sounds, normal peripheral

 pulses


Gastrointestinal/Abdomen Exam: soft, normal bowel sounds, No tenderness, No mass


Back Exam: normal inspection, normal range of motion, No CVA tenderness, No 

vertebral tenderness


Extremity Exam: normal inspection, normal range of motion, pelvis stable


Neurologic Exam: alert, oriented x 3, cooperative, normal mood/affect, nml 

cerebellar function, nml station & gait, sensation nml, No motor deficits


Skin Exam: normal color, warm, dry, No rash


Lymphatic Exam: No adenopathy


**SpO2 Interpretation**: normal


SpO2: 98


O2 Delivery: Room Air





- Course


Nursing assessment & vital signs reviewed: Yes


EKG Interpreted by Me: RATE ([id number]), Sinus Rhythm, NORMAL AXIS, NORMAL INTERVALS





- Radiology Exams


  ** Chest


X-ray Interpretation: Interpreted by me (Cardiomegaly.  Otherwise negative chest

 x-ray)


Ordered Tests: 


                               Active Orders 24 hr











 Category Date Time Status


 


 Cardiac Monitor STAT Care  07/06/22 13:16 Active


 


 EKG-ER Only STAT Care  07/06/22 13:14 Active


 


 IV Insertion STAT Care  07/06/22 13:14 Active


 


 Pulse Oximetry (ED) STAT Care  07/06/22 13:14 Active


 


 CHEST 1 VIEW (PORTABLE) Stat Exams  07/06/22 14:34 Completed


 


 BLOOD CULTURE Stat Lab  07/06/22 13:30 Received


 


 CBC W DIFF Stat Lab  07/06/22 13:30 Completed


 


 CMP Stat Lab  07/06/22 13:30 Completed


 


 D-DIMER QUANTITATIVE Stat Lab  07/06/22 13:32 Completed


 


 ETHYL ALCOHOL Stat Lab  07/06/22 13:30 Completed


 


 Lactic Acid Stat Lab  07/06/22 13:35 Completed


 


 TROPONIN Q3H Lab  07/06/22 13:30 Completed


 


 TROPONIN Q3H Lab  07/06/22 16:15 Completed


 


 TROPONIN Q3H Lab  07/06/22 19:15 Ordered


 


 TROPONIN Q3H Lab  07/06/22 22:15 Ordered


 


 TROPONIN Q3H Lab  07/07/22 01:15 Ordered


 


 UA W/RFX CULTURE Stat Lab  07/06/22 15:13 Completed


 


 Urine Triage Profile Stat Lab  07/06/22 14:35 Completed











Lab/Rad Data: 


                           Laboratory Result Diagrams





                                 07/06/22 13:30 





                                 07/06/22 13:30 





                               Laboratory Results











  07/06/22 07/06/22 07/06/22 Range/Units





  16:15 15:13 14:35 


 


WBC     (4.0-10.5)  x10^3/uL


 


RBC     (4.1-5.6)  x10^6/uL


 


Hgb     (12.5-18.0)  g/dL


 


Hct     (42-50)  %


 


MCV     ()  fL


 


MCH     (26-32)  pg


 


MCHC     (32-36)  g/dL


 


RDW     (11.5-14.0)  %


 


Plt Count     (150-450)  x10^3/uL


 


MPV     (7.5-11.0)  fL


 


Gran %     (36.0-66.0)  %


 


Immature Gran % (Auto)     (0.00-0.4)  %


 


Nucleat RBC Rel Count     (0.00-0.1)  %


 


Eos # (Auto)     (0-0.5)  x10^3/uL


 


Immature Gran # (Auto)     (0.00-0.03)  x10^3u/L


 


Absolute Lymphs (auto)     (1.0-4.6)  x10^3/uL


 


Absolute Monos (auto)     (0.0-1.3)  x10^3/uL


 


Absolute Nucleated RBC     (0.00-0.01)  x10^3u/L


 


Lymphocytes %     (24.0-44.0)  %


 


Monocytes %     (0.0-12.0)  %


 


Eosinophils %     (0.00-5.0)  %


 


Basophils %     (0.0-0.4)  %


 


Absolute Granulocytes     (1.4-6.9)  x10^3/uL


 


Basophils #     (0-0.4)  x10^3/uL


 


D-Dimer     (0.0-0.50)  mg/L


 


Sodium     (137-145)  mmol/L


 


Potassium     (3.5-5.1)  mmol/L


 


Chloride     ()  mmol/L


 


Carbon Dioxide     (22-30)  mmol/L


 


Anion Gap     (5-15)  MEQ/L


 


BUN     (9-20)  mg/dL


 


Creatinine     (0.66-1.25)  mg/dL


 


Estimated GFR     ML/MIN


 


Glucose     ()  mg/dL


 


Lactic Acid     (0.4-2.0)  


 


Calcium     (8.4-10.2)  mg/dL


 


Total Bilirubin     (0.2-1.3)  mg/dL


 


AST     (17-59)  U/L


 


ALT     (0-50)  U/L


 


Alkaline Phosphatase     ()  U/L


 


Troponin I  < 0.012    (0.000-0.034)  ng/mL


 


Serum Total Protein     (6.3-8.2)  g/dL


 


Albumin     (3.5-5.0)  g/dL


 


Urinalys Dipstick Clnc   MAIN LAB   


 


Urine Color   YELLOW   (YELLOW)  


 


Urine Appearance   CLEAR   (CLEAR)  


 


Urine pH   7.0   (5-6)  


 


Ur Specific Gravity   1.015   (1.005-1.025)  


 


POC Urine Protein Conf   NEGATIVE   (Negative)  


 


Urine Ketones   SMALL-15   (NEGATIVE)  


 


Urine Nitrite   NEGATIVE   (NEGATIVE)  


 


Urine Bilirubin   NEGATIVE   (NEGATIVE)  


 


Urine Urobilinogen   1   (0-1)  mg/dL


 


Urine Leukocytes   NEGATIVE   (NEGATIVE)  


 


Urine WBC (Auto)   NONE   (0-5)  /HPF


 


Urine RBC (Auto)   NONE   (0-2)  /HPF


 


U Epithel Cells (Auto)   RARE   (FEW)  /HPF


 


Urine Bacteria (Auto)   NONE   (NEGATIVE)  /HPF


 


Urine RBC   NEGATIVE   (0-5)  Jadien/ul


 


Ur Culture Indicated?   NO   


 


Urine Glucose   NEGATIVE   (NEGATIVE)  mg/dL


 


Urine Opiates Level    NEGATIVE  (NEGATIVE)  


 


Ur Methadone    NEGATIVE  (NEGATIVE)  


 


Urine Barbiturates    NEGATIVE  (NEGATIVE)  


 


Ur Phencyclidine (PCP)    NEGATIVE  (NEGATIVE)  


 


Urine Amphetamine    NEGATIVE  (NEGATIVE)  


 


U Benzodiazepine Level    NEGATIVE  (NEGATIVE)  


 


Urine Cocaine    NEGATIVE  (NEGATIVE)  


 


Urine Marijuana (THC)    NEGATIVE  (NEGATIVE)  


 


Ethyl Alcohol     (0-10)  mg/dL














  07/06/22 07/06/22 07/06/22 Range/Units





  13:35 13:32 13:30 


 


WBC     (4.0-10.5)  x10^3/uL


 


RBC     (4.1-5.6)  x10^6/uL


 


Hgb     (12.5-18.0)  g/dL


 


Hct     (42-50)  %


 


MCV     ()  fL


 


MCH     (26-32)  pg


 


MCHC     (32-36)  g/dL


 


RDW     (11.5-14.0)  %


 


Plt Count     (150-450)  x10^3/uL


 


MPV     (7.5-11.0)  fL


 


Gran %     (36.0-66.0)  %


 


Immature Gran % (Auto)     (0.00-0.4)  %


 


Nucleat RBC Rel Count     (0.00-0.1)  %


 


Eos # (Auto)     (0-0.5)  x10^3/uL


 


Immature Gran # (Auto)     (0.00-0.03)  x10^3u/L


 


Absolute Lymphs (auto)     (1.0-4.6)  x10^3/uL


 


Absolute Monos (auto)     (0.0-1.3)  x10^3/uL


 


Absolute Nucleated RBC     (0.00-0.01)  x10^3u/L


 


Lymphocytes %     (24.0-44.0)  %


 


Monocytes %     (0.0-12.0)  %


 


Eosinophils %     (0.00-5.0)  %


 


Basophils %     (0.0-0.4)  %


 


Absolute Granulocytes     (1.4-6.9)  x10^3/uL


 


Basophils #     (0-0.4)  x10^3/uL


 


D-Dimer   0.27   (0.0-0.50)  mg/L


 


Sodium     (137-145)  mmol/L


 


Potassium     (3.5-5.1)  mmol/L


 


Chloride     ()  mmol/L


 


Carbon Dioxide     (22-30)  mmol/L


 


Anion Gap     (5-15)  MEQ/L


 


BUN     (9-20)  mg/dL


 


Creatinine     (0.66-1.25)  mg/dL


 


Estimated GFR     ML/MIN


 


Glucose     ()  mg/dL


 


Lactic Acid  0.8    (0.4-2.0)  


 


Calcium     (8.4-10.2)  mg/dL


 


Total Bilirubin     (0.2-1.3)  mg/dL


 


AST     (17-59)  U/L


 


ALT     (0-50)  U/L


 


Alkaline Phosphatase     ()  U/L


 


Troponin I    < 0.012  (0.000-0.034)  ng/mL


 


Serum Total Protein     (6.3-8.2)  g/dL


 


Albumin     (3.5-5.0)  g/dL


 


Urinalys Dipstick Clnc     


 


Urine Color     (YELLOW)  


 


Urine Appearance     (CLEAR)  


 


Urine pH     (5-6)  


 


Ur Specific Gravity     (1.005-1.025)  


 


POC Urine Protein Conf     (Negative)  


 


Urine Ketones     (NEGATIVE)  


 


Urine Nitrite     (NEGATIVE)  


 


Urine Bilirubin     (NEGATIVE)  


 


Urine Urobilinogen     (0-1)  mg/dL


 


Urine Leukocytes     (NEGATIVE)  


 


Urine WBC (Auto)     (0-5)  /HPF


 


Urine RBC (Auto)     (0-2)  /HPF


 


U Epithel Cells (Auto)     (FEW)  /HPF


 


Urine Bacteria (Auto)     (NEGATIVE)  /HPF


 


Urine RBC     (0-5)  Jaiden/ul


 


Ur Culture Indicated?     


 


Urine Glucose     (NEGATIVE)  mg/dL


 


Urine Opiates Level     (NEGATIVE)  


 


Ur Methadone     (NEGATIVE)  


 


Urine Barbiturates     (NEGATIVE)  


 


Ur Phencyclidine (PCP)     (NEGATIVE)  


 


Urine Amphetamine     (NEGATIVE)  


 


U Benzodiazepine Level     (NEGATIVE)  


 


Urine Cocaine     (NEGATIVE)  


 


Urine Marijuana (THC)     (NEGATIVE)  


 


Ethyl Alcohol     (0-10)  mg/dL














  07/06/22 07/06/22 Range/Units





  13:30 13:30 


 


WBC   6.9  (4.0-10.5)  x10^3/uL


 


RBC   4.83  (4.1-5.6)  x10^6/uL


 


Hgb   12.6  (12.5-18.0)  g/dL


 


Hct   38.3 L  (42-50)  %


 


MCV   79.3  ()  fL


 


MCH   26.1  (26-32)  pg


 


MCHC   32.9  (32-36)  g/dL


 


RDW   15.2 H  (11.5-14.0)  %


 


Plt Count   287  (150-450)  x10^3/uL


 


MPV   9.5  (7.5-11.0)  fL


 


Gran %   65.3  (36.0-66.0)  %


 


Immature Gran % (Auto)   0.3  (0.00-0.4)  %


 


Nucleat RBC Rel Count   0.0  (0.00-0.1)  %


 


Eos # (Auto)   0.14  (0-0.5)  x10^3/uL


 


Immature Gran # (Auto)   0.02  (0.00-0.03)  x10^3u/L


 


Absolute Lymphs (auto)   1.52  (1.0-4.6)  x10^3/uL


 


Absolute Monos (auto)   0.68  (0.0-1.3)  x10^3/uL


 


Absolute Nucleated RBC   0.00  (0.00-0.01)  x10^3u/L


 


Lymphocytes %   22.0 L  (24.0-44.0)  %


 


Monocytes %   9.8  (0.0-12.0)  %


 


Eosinophils %   2.0  (0.00-5.0)  %


 


Basophils %   0.6  (0.0-0.4)  %


 


Absolute Granulocytes   4.51  (1.4-6.9)  x10^3/uL


 


Basophils #   0.04  (0-0.4)  x10^3/uL


 


D-Dimer    (0.0-0.50)  mg/L


 


Sodium  138   (137-145)  mmol/L


 


Potassium  3.9   (3.5-5.1)  mmol/L


 


Chloride  103   ()  mmol/L


 


Carbon Dioxide  26   (22-30)  mmol/L


 


Anion Gap  13.5   (5-15)  MEQ/L


 


BUN  10   (9-20)  mg/dL


 


Creatinine  0.65 L   (0.66-1.25)  mg/dL


 


Estimated GFR  > 60.0   ML/MIN


 


Glucose  105   ()  mg/dL


 


Lactic Acid    (0.4-2.0)  


 


Calcium  9.5   (8.4-10.2)  mg/dL


 


Total Bilirubin  1.20   (0.2-1.3)  mg/dL


 


AST  39   (17-59)  U/L


 


ALT  28   (0-50)  U/L


 


Alkaline Phosphatase  82   ()  U/L


 


Troponin I    (0.000-0.034)  ng/mL


 


Serum Total Protein  7.1   (6.3-8.2)  g/dL


 


Albumin  3.9   (3.5-5.0)  g/dL


 


Urinalys Dipstick Clnc    


 


Urine Color    (YELLOW)  


 


Urine Appearance    (CLEAR)  


 


Urine pH    (5-6)  


 


Ur Specific Gravity    (1.005-1.025)  


 


POC Urine Protein Conf    (Negative)  


 


Urine Ketones    (NEGATIVE)  


 


Urine Nitrite    (NEGATIVE)  


 


Urine Bilirubin    (NEGATIVE)  


 


Urine Urobilinogen    (0-1)  mg/dL


 


Urine Leukocytes    (NEGATIVE)  


 


Urine WBC (Auto)    (0-5)  /HPF


 


Urine RBC (Auto)    (0-2)  /HPF


 


U Epithel Cells (Auto)    (FEW)  /HPF


 


Urine Bacteria (Auto)    (NEGATIVE)  /HPF


 


Urine RBC    (0-5)  Jaiden/ul


 


Ur Culture Indicated?    


 


Urine Glucose    (NEGATIVE)  mg/dL


 


Urine Opiates Level    (NEGATIVE)  


 


Ur Methadone    (NEGATIVE)  


 


Urine Barbiturates    (NEGATIVE)  


 


Ur Phencyclidine (PCP)    (NEGATIVE)  


 


Urine Amphetamine    (NEGATIVE)  


 


U Benzodiazepine Level    (NEGATIVE)  


 


Urine Cocaine    (NEGATIVE)  


 


Urine Marijuana (THC)    (NEGATIVE)  


 


Ethyl Alcohol  < 10   (0-10)  mg/dL














- Progress


Progress: improved


Progress Note: 





07/06/22 17:33


Patient reassessed.  Chest pain resolved.  D-dimer negative.  Troponin negative 

x2.  EKG normal sinus rhythm.  Chest x-ray clear.  Case discussed with Dr. Ceron.  Dr. Ceron agrees with disposition.  He agrees the patient should be 

discharged with outpatient follow-up.  Plan of care discussed with patient.  He 

agrees to follow-up within 48 hours for evaluation.





Portions of this note were created with voice recognition technology.  There may

 be grammatical, spelling, punctuation or sound alike errors


Will see patient in: hospital (observation)


Counseled pt/family regarding: lab results, diagnosis, rad results





- Departure


Departure Disposition: Home


Clinical Impression: 


 Chest pain, Cardiomegaly





Condition: Stable


Critical Care Time: No


Referrals: 


LORI CERON MD [Primary Care Provider] - Follow up/PCP as directed


Instructions:  Angina (DC)


Additional Instructions: 


Discharge/Care Plan





EMERALDKRISTINA VARGAS was seen on 07/06/22 in the Emergency Room. The patient was 

counseled regarding Diagnosis,Lab results, Imaging studies, need for follow up 

and when to return to the Emergency Room.





Prescriptions given:





Discharge Note





I have spoken with the patient and/or caregivers. I have explained the patient's

condition, diagnosis and treatment plan based on the information available to me

at this time. I have answered the patient's and/or caregiver's questions and 

addressed any concerns. The patient and/or caregivers have as good understanding

of the patient's diagnosis, condition and treatment plan as can be expected at 

this point. The vital signs have been stable. The patient's condition is stable 

and appropriate for discharge from the emergency department.





The patient will pursue further outpatient evaluation with the primary care 

physician or other designated or consulting physician as outlined in the 

discharge instructions. The patient and/or caregivers are agreeable to this plan

of care and follow-up instructions have been explained in detail. The patient 

and/or caregivers have received these instruction. The patient/and or caregivers

are aware that any significant change in condition or worsening of symptoms 

should prompt an immediate return to this or the closest emergency department or

call 911.
